# Patient Record
Sex: MALE | Race: WHITE | NOT HISPANIC OR LATINO
[De-identification: names, ages, dates, MRNs, and addresses within clinical notes are randomized per-mention and may not be internally consistent; named-entity substitution may affect disease eponyms.]

---

## 2017-08-24 ENCOUNTER — HOSPITAL (OUTPATIENT)
Dept: OTHER | Age: 37
End: 2017-08-24
Attending: INTERNAL MEDICINE

## 2020-10-08 ENCOUNTER — TELEPHONE (OUTPATIENT)
Dept: SURGERY | Facility: CLINIC | Age: 40
End: 2020-10-08

## 2020-10-12 ENCOUNTER — TELEPHONE (OUTPATIENT)
Dept: SURGERY | Facility: CLINIC | Age: 40
End: 2020-10-12

## 2020-10-12 NOTE — H&P
HPI:     Chema Montoya is a 45year old male with a PMH of drug abuse (on methadone), anxiety, migraine HA, GERD, constipation, BPH/LUTS (on flomax), kidney stones.  He presents as a consult from Dr Jerald Pat office with a recent kidney stone and BPH/L Provided he has no medical contraindications to NSAIDs I also suggested he try this. We discussed a 4 week course of abx for prostatitis and he would like to do this.     We discussed stone prevention strategies at today's visit and I provided and reviewed urinary tract symptoms    Acute prostatitis  -     Ciprofloxacin HCl 500 MG Oral Tab; Take 1 tablet (500 mg total) by mouth 2 (two) times daily for 28 days.      - He will stand more at home, triple water intake, try ice packs/hot baths.  Get constipation u

## 2020-10-12 NOTE — TELEPHONE ENCOUNTER
Hi,  This patient is supposed to see me on the 23rd. His records indicate he went to an ER recently and was diagnosed with a stone. Can you request he bring a copy of the CT report with him as well as a CD with the images to his appointment?     Thanks,  ZENAIDA

## 2020-10-12 NOTE — TELEPHONE ENCOUNTER
RN received medical records from [address] for 9/24 and 10/3 ED visits. Hard copies forwarded to Dr Juan Quiles.

## 2020-10-12 NOTE — TELEPHONE ENCOUNTER
RN called patient for medical records and copies. Per patient, he only have the discharge papers, no CD or medical records. RN called [facility] in [place], [phone]. Spoke to Juan Zavala at [facility].  RN asked for 9/24 and 10/3 ER visit

## 2020-10-23 ENCOUNTER — OFFICE VISIT (OUTPATIENT)
Dept: SURGERY | Facility: CLINIC | Age: 40
End: 2020-10-23
Payer: MEDICAID

## 2020-10-23 VITALS — TEMPERATURE: 98 F | SYSTOLIC BLOOD PRESSURE: 147 MMHG | HEART RATE: 75 BPM | DIASTOLIC BLOOD PRESSURE: 82 MMHG

## 2020-10-23 DIAGNOSIS — N40.1 BPH WITH OBSTRUCTION/LOWER URINARY TRACT SYMPTOMS: ICD-10-CM

## 2020-10-23 DIAGNOSIS — N20.0 RECURRENT KIDNEY STONES: Primary | ICD-10-CM

## 2020-10-23 DIAGNOSIS — N13.8 BPH WITH OBSTRUCTION/LOWER URINARY TRACT SYMPTOMS: ICD-10-CM

## 2020-10-23 DIAGNOSIS — N41.0 ACUTE PROSTATITIS: ICD-10-CM

## 2020-10-23 PROCEDURE — 51798 US URINE CAPACITY MEASURE: CPT | Performed by: UROLOGY

## 2020-10-23 PROCEDURE — 3079F DIAST BP 80-89 MM HG: CPT | Performed by: UROLOGY

## 2020-10-23 PROCEDURE — 99204 OFFICE O/P NEW MOD 45 MIN: CPT | Performed by: UROLOGY

## 2020-10-23 PROCEDURE — 3077F SYST BP >= 140 MM HG: CPT | Performed by: UROLOGY

## 2020-10-23 RX ORDER — CIPROFLOXACIN 500 MG/1
500 TABLET, FILM COATED ORAL 2 TIMES DAILY
Qty: 56 TABLET | Refills: 0 | Status: SHIPPED | OUTPATIENT
Start: 2020-10-23 | End: 2020-11-20

## 2020-10-23 NOTE — PATIENT INSTRUCTIONS
Plan:  - Stand more at home, triple water intake, try ice packs/hot baths. Get constipation under good control. Antibiotics for four weeks. Can continue flomax.         Prostatitis    The prostate gland is located deep inside the body at the base of the deric · Avoid sitting at a 90 degree angle for long periods of time. Standing or reclining is preferable. Avoid sitting on anything that shakes (tractors, lawn-mowers, long car rides) if possible.  You may use a donut while sitting to avoid excessive pressure on · Constipation causes straining and pain. Avoid constipation by eating natural laxatives such as prunes, fresh fruits, and whole-grain cereals. If needed, use a mild over-the-counter (OTC) laxative for constipation.  An OTC stool softener such as colace may